# Patient Record
Sex: MALE | Race: BLACK OR AFRICAN AMERICAN
[De-identification: names, ages, dates, MRNs, and addresses within clinical notes are randomized per-mention and may not be internally consistent; named-entity substitution may affect disease eponyms.]

---

## 2020-11-30 ENCOUNTER — HOSPITAL ENCOUNTER (EMERGENCY)
Dept: HOSPITAL 62 - ER | Age: 30
Discharge: HOME | End: 2020-11-30
Payer: SELF-PAY

## 2020-11-30 VITALS — DIASTOLIC BLOOD PRESSURE: 94 MMHG | SYSTOLIC BLOOD PRESSURE: 148 MMHG

## 2020-11-30 DIAGNOSIS — R10.84: Primary | ICD-10-CM

## 2020-11-30 DIAGNOSIS — R11.0: ICD-10-CM

## 2020-11-30 PROCEDURE — 99282 EMERGENCY DEPT VISIT SF MDM: CPT

## 2020-11-30 NOTE — ER DOCUMENT REPORT
ED General





- General


Chief Complaint: Medical Complaint


Stated Complaint: MEDICAL COMPLAINT


Time Seen by Provider: 11/30/20 10:53





- HPI


Notes: 





Patient is a 30-year-old male presents emergency department for evaluation.  He 

left work on Saturday secondary to feeling "queasy."  He states that he try to 

go back to work on Sunday when he felt better but he could not go back without a

doctor's excuse.  He denies any fevers or chills.  No nausea or vomiting.  He 

has no abdominal pain.  Normal bowel movements.  Normal urination.  He is eating

and drinking normally.





- Related Data


Allergies/Adverse Reactions: 


                                        





No Known Allergies Allergy (Unverified 09/17/12 10:33)


   








Home Medications: None





Past Medical History





- General


Information source: Patient





- Social History


Smoking Status: Never Smoker


Drug Abuse: None


Family History: DM, Hypertension





- Medical History


Medical History: Negative


Surgical Hx: Negative





- Immunizations


Hx Diphtheria, Pertussis, Tetanus Vaccination: Yes





Review of Systems





- Review of Systems


Constitutional: No symptoms reported


EENT: No symptoms reported


Cardiovascular: No symptoms reported


Respiratory: No symptoms reported


Gastrointestinal: See HPI


Genitourinary: No symptoms reported


Musculoskeletal: No symptoms reported


Skin: No symptoms reported


Neurological/Psychological: No symptoms reported





Physical Exam





- Vital signs


Vitals: 





                                        











Temp Pulse Resp BP Pulse Ox


 


 98.1 F   88   16   151/96 H  100 


 


 11/30/20 09:35  11/30/20 09:35  11/30/20 09:35  11/30/20 09:35  11/30/20 09:35














- Notes


Notes: 





Vital signs reviewed, please refer to chart. Head is normocephalic, atraumatic. 

Pupils equal round, reactive to light.  Neck is supple without meningismus.  

Heart is regular rate and rhythm.  Lungs are clear to auscultation bilaterally. 

Abdomen is soft, nontender, normoactive bowel sounds throughout.  Extremities 

without cyanosis, clubbing. Posterior calves are nontender.  Peripheral pulses 

are equal.  Skin is warm and dry.  Patient is awake, alert, neurological exam is

nonfocal.





Course





- Re-evaluation


Re-evalutation: 





11/30/20 11:02


Patient presents emergency department for evaluation.  He was evaluated here for

a complaint that he had on Saturday which is now resolved.  His exam is 

unremarkable.  At this point I see no reason that he should have laboratory 

investigations or not be cleared to go back to work.  I will write him a work 

release, he is to follow-up with primary care, return to the ED with worsening.





- Vital Signs


Vital signs: 





                                        











Temp Pulse Resp BP Pulse Ox


 


 98.1 F   88   16   151/96 H  100 


 


 11/30/20 09:35  11/30/20 09:35  11/30/20 09:35  11/30/20 09:35  11/30/20 09:35














Discharge





- Discharge


Clinical Impression: 


 Nausea, Generalized abdominal pain





Condition: Stable


Disposition: HOME, SELF-CARE


Instructions:  Abdominal Pain ([facility])


Additional Instructions: 


Your blood pressure was mildly elevated here today.  Please follow-up with your 

primary care provider in regards to this.  Otherwise you are cleared to return 

to work.  Return to the emergency department with worsening or new concerning 

symptoms of any sort.


Forms:  Elevated Blood Pressure, Return to Work